# Patient Record
Sex: FEMALE | Race: WHITE | NOT HISPANIC OR LATINO | Employment: FULL TIME | URBAN - METROPOLITAN AREA
[De-identification: names, ages, dates, MRNs, and addresses within clinical notes are randomized per-mention and may not be internally consistent; named-entity substitution may affect disease eponyms.]

---

## 2017-06-02 ENCOUNTER — TRANSCRIBE ORDERS (OUTPATIENT)
Dept: ADMINISTRATIVE | Facility: HOSPITAL | Age: 57
End: 2017-06-02

## 2017-06-02 DIAGNOSIS — M79.602 BILATERAL ARM PAIN: Primary | ICD-10-CM

## 2017-06-02 DIAGNOSIS — M79.601 BILATERAL ARM PAIN: Primary | ICD-10-CM

## 2017-06-15 ENCOUNTER — APPOINTMENT (OUTPATIENT)
Dept: INFUSION THERAPY | Facility: HOSPITAL | Age: 57
End: 2017-06-15
Attending: SPECIALIST

## 2017-06-27 ENCOUNTER — HOSPITAL ENCOUNTER (OUTPATIENT)
Dept: INFUSION THERAPY | Facility: HOSPITAL | Age: 57
End: 2017-06-27
Attending: SPECIALIST

## 2017-07-20 ENCOUNTER — HOSPITAL ENCOUNTER (OUTPATIENT)
Dept: INFUSION THERAPY | Facility: HOSPITAL | Age: 57
Discharge: HOME | End: 2017-07-20
Attending: SPECIALIST | Admitting: PSYCHIATRY & NEUROLOGY

## 2017-07-20 DIAGNOSIS — M79.601 BILATERAL ARM PAIN: ICD-10-CM

## 2017-07-20 DIAGNOSIS — G56.03 BILATERAL CARPAL TUNNEL SYNDROME: Primary | ICD-10-CM

## 2017-07-20 DIAGNOSIS — M79.602 BILATERAL ARM PAIN: ICD-10-CM

## 2017-07-20 PROCEDURE — 95911 NRV CNDJ TEST 9-10 STUDIES: CPT

## 2017-07-20 PROCEDURE — 95886 MUSC TEST DONE W/N TEST COMP: CPT | Performed by: PSYCHIATRY & NEUROLOGY

## 2017-07-20 PROCEDURE — 95911 NRV CNDJ TEST 9-10 STUDIES: CPT | Performed by: PSYCHIATRY & NEUROLOGY

## 2017-07-20 PROCEDURE — 95886 MUSC TEST DONE W/N TEST COMP: CPT

## 2017-09-20 ENCOUNTER — HOSPITAL ENCOUNTER (OUTPATIENT)
Dept: CARDIOLOGY | Facility: HOSPITAL | Age: 57
Discharge: HOME | End: 2017-09-20
Attending: SPECIALIST | Admitting: SPECIALIST

## 2017-09-20 ENCOUNTER — LAB (OUTPATIENT)
Dept: LAB | Facility: HOSPITAL | Age: 57
End: 2017-09-20
Attending: SPECIALIST

## 2017-09-20 ENCOUNTER — TRANSCRIBE ORDERS (OUTPATIENT)
Dept: ADMINISTRATIVE | Facility: HOSPITAL | Age: 57
End: 2017-09-20

## 2017-09-20 DIAGNOSIS — I10 ESSENTIAL HYPERTENSION: ICD-10-CM

## 2017-09-20 DIAGNOSIS — I10 ESSENTIAL HYPERTENSION: Primary | ICD-10-CM

## 2017-09-20 DIAGNOSIS — Z01.818 PRE-OP TESTING: ICD-10-CM

## 2017-09-20 LAB
ANION GAP SERPL CALCULATED.3IONS-SCNC: 13.6 MMOL/L
BUN BLD-MCNC: 11 MG/DL (ref 6–20)
BUN/CREAT SERPL: 14.5 (ref 7–25)
CALCIUM SPEC-SCNC: 9.9 MG/DL (ref 8.6–10.5)
CHLORIDE SERPL-SCNC: 102 MMOL/L (ref 98–107)
CO2 SERPL-SCNC: 27.4 MMOL/L (ref 22–29)
CREAT BLD-MCNC: 0.76 MG/DL (ref 0.57–1)
DEPRECATED RDW RBC AUTO: 42.2 FL (ref 37–54)
ERYTHROCYTE [DISTWIDTH] IN BLOOD BY AUTOMATED COUNT: 13.5 % (ref 11.7–13)
GFR SERPL CREATININE-BSD FRML MDRD: 79 ML/MIN/1.73
GLUCOSE BLD-MCNC: 101 MG/DL (ref 65–99)
HCT VFR BLD AUTO: 41.3 % (ref 35.6–45.5)
HGB BLD-MCNC: 13.3 G/DL (ref 11.9–15.5)
MCH RBC QN AUTO: 27.7 PG (ref 26.9–32)
MCHC RBC AUTO-ENTMCNC: 32.2 G/DL (ref 32.4–36.3)
MCV RBC AUTO: 86 FL (ref 80.5–98.2)
PLATELET # BLD AUTO: 309 10*3/MM3 (ref 140–500)
PMV BLD AUTO: 10 FL (ref 6–12)
POTASSIUM BLD-SCNC: 3.8 MMOL/L (ref 3.5–5.2)
RBC # BLD AUTO: 4.8 10*6/MM3 (ref 3.9–5.2)
SODIUM BLD-SCNC: 143 MMOL/L (ref 136–145)
WBC NRBC COR # BLD: 4.98 10*3/MM3 (ref 4.5–10.7)

## 2017-09-20 PROCEDURE — 93005 ELECTROCARDIOGRAM TRACING: CPT | Performed by: SPECIALIST

## 2017-09-20 PROCEDURE — 80048 BASIC METABOLIC PNL TOTAL CA: CPT

## 2017-09-20 PROCEDURE — 93010 ELECTROCARDIOGRAM REPORT: CPT | Performed by: INTERNAL MEDICINE

## 2017-09-20 PROCEDURE — 85027 COMPLETE CBC AUTOMATED: CPT

## 2017-09-20 PROCEDURE — 36415 COLL VENOUS BLD VENIPUNCTURE: CPT

## 2020-01-28 ENCOUNTER — TRANSCRIBE ORDERS (OUTPATIENT)
Dept: PHYSICAL THERAPY | Facility: CLINIC | Age: 60
End: 2020-01-28

## 2020-01-28 ENCOUNTER — TREATMENT (OUTPATIENT)
Dept: PHYSICAL THERAPY | Facility: CLINIC | Age: 60
End: 2020-01-28

## 2020-01-28 DIAGNOSIS — M79.641 PAIN OF RIGHT HAND: ICD-10-CM

## 2020-01-28 DIAGNOSIS — M25.531 RIGHT WRIST PAIN: Primary | ICD-10-CM

## 2020-01-28 DIAGNOSIS — G56.01 CARPAL TUNNEL SYNDROME OF RIGHT WRIST: Primary | ICD-10-CM

## 2020-01-28 DIAGNOSIS — M25.531 RIGHT WRIST PAIN: ICD-10-CM

## 2020-01-28 PROCEDURE — 97161 PT EVAL LOW COMPLEX 20 MIN: CPT | Performed by: PHYSICAL THERAPIST

## 2020-01-28 PROCEDURE — 97110 THERAPEUTIC EXERCISES: CPT | Performed by: PHYSICAL THERAPIST

## 2020-01-28 NOTE — PROGRESS NOTES
Physical Therapy Initial Evaluation and Plan of Care        Subjective Evaluation    History of Present Illness  Date of surgery: 12/19/2019  Mechanism of injury: Patient reports that her right wrist has been bothering her for 5 years which has progressively been getting worse. She states that she eventually decided to have carpel tunnel surgery.  12/19/2019    She states that overall it is feeling better however the scar is not completely healed.  She has been wearing the brace most of the time.       She anticipated return to full duty after her Feb appointment with her referring physician.     Job duties:  Medical office - Charting, checking patient in, computer, writing, computer work    Hobbies/recreational Activities:  Grandchildren (baby),  sewing, embroidering,                     Quality of life: good    Pain  Current pain rating: 3  Quality: throbbing, tight and radiating  Aggravating factors: keyboarding and lifting  Progression: improved    Social Support  Lives with: spouse    Hand dominance: right    Treatments  Previous treatment: medication  Patient Goals  Patient goals for therapy: increased strength, return to work, independence with ADLs/IADLs, decreased pain and increased motion             Objective       Active Range of Motion     Right Wrist   Wrist flexion: 45 degrees   Wrist extension: 28 degrees   Radial deviation: 26 degrees   Ulnar deviation: 45 degrees     Strength/Myotome Testing     Left Wrist/Hand      (2nd hand position)     Trial 1: 55 lbs    Trial 2: 53 lbs    Trial 3: 50 lbs    Average: 52.67 lbs    Thumb Strength  Key/Lateral Pinch     Trial 1: 10 lbs    Trial 2: 8 lbs    Trial 3: 9 lbs    Average: 9 lbs       Functional Scale:  Quick Dash  Score:  65.91      Assessment & Plan     Assessment  Impairments: abnormal or restricted ROM, activity intolerance, lacks appropriate home exercise program and pain with function  Assessment details: Patient is a 60 y/o female who presents  with increased right wrist pain following carpel tunnel surgery in December.  Upon evaluation she has decreased ROM and ability to make full composite .   strength was not tested at Initial evaluation due to surgergical precautions. 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   Prognosis: good  Functional Limitations: carrying objects, lifting and uncomfortable because of pain  Goals  Plan Goals: Long Term Goals (8 weeks)    Patient is able to return to work/community activities with minimal to no discomfort  Patient is able to lift 15 lbs from floor to waist  Patient is able to write with dominant right hand without difficulty.   Patient is able to key for work related tasks.   Patient has  strength equal to non dominant contralateral hand.       Short Term Goals (4 weeks)    Patient is independent with HEP  Patient has full AROM/PROM of right wrist.   Patient has greater than 50% improvement overall.   Patient is able to perform bathing and grooming activities with use of involved UE extremity.    Patient has minimal to no tenderness to palpation.                   Plan  Therapy options: will be seen for skilled physical therapy services  Planned modality interventions: TENS, ultrasound, thermotherapy (hydrocollator packs) and cryotherapy  Planned therapy interventions: manual therapy, soft tissue mobilization, strengthening, stretching, therapeutic activities, joint mobilization, home exercise program, functional ROM exercises, flexibility and body mechanics training  Frequency: 24 visits.  Treatment plan discussed with: patient        Manual Therapy:    0     mins  58469;  Therapeutic Exercise:    15     mins  57616;     Neuromuscular Kaiden:    0    mins  54655;    Therapeutic Activity:     0     mins  91645;     Gait Trainin     mins  83852;     Ultrasound:     0     mins  78263;    Work  Hardening           0      mins 77790  Iontophoresis               0   mins 88836    Timed Treatment:   15   mins   Total Treatment:     40   mins    PT SIGNATURE: Yulisa Sky, PT   DATE TREATMENT INITIATED: 1/28/2020    Initial Certification  Certification Period: 4/27/2020  I certify that the therapy services are furnished while this patient is under my care.  The services outlined above are required by this patient, and will be reviewed every 90 days.     PHYSICIAN:       DATE:     Please sign and return via fax to [phone].. Thank you, [facility].

## 2020-01-30 ENCOUNTER — TREATMENT (OUTPATIENT)
Dept: PHYSICAL THERAPY | Facility: CLINIC | Age: 60
End: 2020-01-30

## 2020-01-30 DIAGNOSIS — G56.01 CARPAL TUNNEL SYNDROME OF RIGHT WRIST: Primary | ICD-10-CM

## 2020-01-30 DIAGNOSIS — M25.531 RIGHT WRIST PAIN: ICD-10-CM

## 2020-01-30 DIAGNOSIS — M79.641 PAIN OF RIGHT HAND: ICD-10-CM

## 2020-01-30 PROCEDURE — 97110 THERAPEUTIC EXERCISES: CPT | Performed by: PHYSICAL THERAPIST

## 2020-01-30 PROCEDURE — 97140 MANUAL THERAPY 1/> REGIONS: CPT | Performed by: PHYSICAL THERAPIST

## 2020-01-30 NOTE — PROGRESS NOTES
Physical Therapy Daily Progress Note      Visit # 2      Subjective Evaluation    History of Present Illness    Subjective comment: The wrist is feeling a little better. The right wrist is still pretty stiff. Pain  Current pain ratin           Objective   See Exercise, Manual, and Modality Logs for complete treatment.       Assessment & Plan     Assessment  Assessment details: Pt reports the wrist is feeling better.  Her surgical incision is still healing with scab and moist smell.  She had a well sealed bandage on the wound that may have been keeping the wound moist. She was using a band aid   Advised patient to use a gauze pad to cover the wound.  Her brace is quite dirty and may be contributing to the slow healing of her wound.                       Manual Therapy:    15      mins  02437;  Therapeutic Exercise:    25     mins  47517;     Neuromuscular Kaiden:        mins  52482;    Therapeutic Activity:          mins  42198;     Gait Training:           mins  87279;     Ultrasound:          mins  22048;    Work Hardening                 mins 53441  Iontophoresis                  mins 10446    Timed Treatment:   40   mins   Total Treatment:     50   mins    Negin Hearn, PT  Physical Therapist

## 2020-02-03 ENCOUNTER — TREATMENT (OUTPATIENT)
Dept: PHYSICAL THERAPY | Facility: CLINIC | Age: 60
End: 2020-02-03

## 2020-02-03 DIAGNOSIS — M79.641 PAIN OF RIGHT HAND: ICD-10-CM

## 2020-02-03 DIAGNOSIS — G56.01 CARPAL TUNNEL SYNDROME OF RIGHT WRIST: Primary | ICD-10-CM

## 2020-02-03 DIAGNOSIS — M25.531 RIGHT WRIST PAIN: ICD-10-CM

## 2020-02-03 PROCEDURE — 97140 MANUAL THERAPY 1/> REGIONS: CPT | Performed by: PHYSICAL THERAPIST

## 2020-02-03 PROCEDURE — 97110 THERAPEUTIC EXERCISES: CPT | Performed by: PHYSICAL THERAPIST

## 2020-02-03 NOTE — PROGRESS NOTES
"Physical Therapy Daily Progress Note    Visit # 3    Mariella Hays reports: \"Wrist is feeling pretty good.  It's getting more mobile/moving better.  Sometimes I get weird pains in different places that I know are from the nerves healing.\"    Subjective Evaluation    Pain  Current pain ratin  Location: (R) digits           Objective   See Exercise, Manual, and Modality Logs for complete treatment.   *Increased AROM repetitions   *Initiated digit abduction and extension from table    Assessment & Plan     Assessment  Assessment details: Patient exhibits moderate proximal palmar and incisional tenderness but otherwise tolerates all manual techniques well. She is moderately limited in (R) wrist PROM all planes and minimally limited (R) digits PROM all planes.  Incision appears well-healing without the presence of a scab or an odor this date. She is able to progress basic ROM activities this date without significant soreness.  Updated HEP printout is issued to facilitate compliance and recall.        Progress per Plan of Care with ROM emphasis.          Manual Therapy:    10     mins  38511;  Therapeutic Exercise:    18     mins  91400;     Neuromuscular Kaiden:        mins  87066;    Therapeutic Activity:          mins  54668;     Gait Training:           mins  33143;     Ultrasound:          mins  01848;    Electrical Stimulation:         mins  35886 ( );  Dry Needling          mins self-pay    Timed Treatment:   28   mins   Total Treatment:     38   mins    Destinee Bernal PT, DPT  Physical Therapist  [place] License # [id number]    "

## 2020-02-05 ENCOUNTER — TREATMENT (OUTPATIENT)
Dept: PHYSICAL THERAPY | Facility: CLINIC | Age: 60
End: 2020-02-05

## 2020-02-05 DIAGNOSIS — M25.531 RIGHT WRIST PAIN: ICD-10-CM

## 2020-02-05 DIAGNOSIS — G56.01 CARPAL TUNNEL SYNDROME OF RIGHT WRIST: Primary | ICD-10-CM

## 2020-02-05 DIAGNOSIS — M79.641 PAIN OF RIGHT HAND: ICD-10-CM

## 2020-02-05 PROCEDURE — 97140 MANUAL THERAPY 1/> REGIONS: CPT | Performed by: PHYSICAL THERAPIST

## 2020-02-05 NOTE — PROGRESS NOTES
Physical Therapy Daily Progress Note    Visit # 4    Mariella Hays reports: Wrist and hand are feeling good and are moving better.  The tenderness is getting better too.    Subjective     Objective   See Exercise, Manual, and Modality Logs for complete treatment.   *Issued  ball    Assessment & Plan     Assessment  Assessment details: Treatment is shortened this date due to patient arriving late for PT appointment.  PROM of (R) wrist is mildly limited all planes but not significantly painful.  PROM of (R) metacarpals and digits is nearly WNL and painfree.  Patient is issued a  ball and instructed in initiating sub-painful  strengthening for hand intrinsic strength.        Progress per Plan of Care         Manual Therapy:    13     mins  87760;  Therapeutic Exercise:         mins  69476;     Neuromuscular Kaiden:        mins  58654;    Therapeutic Activity:          mins  79714;     Gait Training:           mins  06302;     Ultrasound:          mins  58206;    Electrical Stimulation:         mins  71254 ( );  Dry Needling          mins self-pay    Timed Treatment:   13   mins   Total Treatment:     17   mins    Destinee Bernal PT, DPT  Physical Therapist  [place] License # [id number]

## 2020-02-06 ENCOUNTER — TREATMENT (OUTPATIENT)
Dept: PHYSICAL THERAPY | Facility: CLINIC | Age: 60
End: 2020-02-06

## 2020-02-06 DIAGNOSIS — M25.531 RIGHT WRIST PAIN: ICD-10-CM

## 2020-02-06 DIAGNOSIS — M79.641 PAIN OF RIGHT HAND: ICD-10-CM

## 2020-02-06 DIAGNOSIS — G56.01 CARPAL TUNNEL SYNDROME OF RIGHT WRIST: Primary | ICD-10-CM

## 2020-02-06 PROCEDURE — 97110 THERAPEUTIC EXERCISES: CPT | Performed by: PHYSICAL THERAPIST

## 2020-02-06 PROCEDURE — 97140 MANUAL THERAPY 1/> REGIONS: CPT | Performed by: PHYSICAL THERAPIST

## 2020-02-06 NOTE — PROGRESS NOTES
"Physical Therapy Daily Progress Note    Visit # 5    Mariella Hays reports: Wrist and hand are feeling good and more mobile.  Not too much pain except every now and then one of those crazy sharp pains from the nerves healing.\"    Subjective     Objective   See Exercise, Manual, and Modality Logs for complete treatment.   *Initiated powerweb flexion and flexbar bends    Assessment & Plan     Assessment  Assessment details: Patient exhibits signs of stitch re-emerging at distal aspect of incision with accompanying mild soreness but no significant redness or any signs of infection.  Will monitor closely and educated patient in doing the same.  She is able to initiate basic strengthening activities this date with good concentric and eccentric control.  She exhibits moderate wrist radial and ulnar deviation AROM as well as mildly decreased wrist flexion and extension AROM.        Progress per Plan of Care         Manual Therapy:    8     mins  11085;  Therapeutic Exercise:    18     mins  54150;     Neuromuscular Kaiden:        mins  14697;    Therapeutic Activity:          mins  27738;     Gait Training:           mins  87504;     Ultrasound:          mins  16001;    Electrical Stimulation:         mins  25856 ( );  Dry Needling          mins self-pay    Timed Treatment:   26   mins   Total Treatment:     38   mins    Destinee Bernal PT, DPT  Physical Therapist  [place] License # [id number]    "

## 2020-03-06 ENCOUNTER — DOCUMENTATION (OUTPATIENT)
Dept: PHYSICAL THERAPY | Facility: CLINIC | Age: 60
End: 2020-03-06